# Patient Record
Sex: FEMALE | Race: BLACK OR AFRICAN AMERICAN | NOT HISPANIC OR LATINO | Employment: UNEMPLOYED | URBAN - METROPOLITAN AREA
[De-identification: names, ages, dates, MRNs, and addresses within clinical notes are randomized per-mention and may not be internally consistent; named-entity substitution may affect disease eponyms.]

---

## 2022-04-08 ENCOUNTER — HOSPITAL ENCOUNTER (EMERGENCY)
Facility: HOSPITAL | Age: 22
Discharge: HOME | End: 2022-04-09
Attending: EMERGENCY MEDICINE | Admitting: EMERGENCY MEDICINE
Payer: COMMERCIAL

## 2022-04-08 ENCOUNTER — APPOINTMENT (EMERGENCY)
Dept: RADIOLOGY | Facility: HOSPITAL | Age: 22
End: 2022-04-08
Payer: COMMERCIAL

## 2022-04-08 DIAGNOSIS — R10.9 ACUTE ABDOMINAL PAIN: Primary | ICD-10-CM

## 2022-04-08 DIAGNOSIS — R03.0 ELEVATED BLOOD PRESSURE READING: ICD-10-CM

## 2022-04-08 DIAGNOSIS — R11.0 NAUSEA: ICD-10-CM

## 2022-04-08 DIAGNOSIS — N93.9 VAGINAL SPOTTING: ICD-10-CM

## 2022-04-08 LAB
ALBUMIN SERPL BCP-MCNC: 4.2 G/DL (ref 3.5–5)
ALP SERPL-CCNC: 113 U/L (ref 46–116)
ALT SERPL W P-5'-P-CCNC: 23 U/L (ref 12–78)
ANION GAP SERPL CALCULATED.3IONS-SCNC: 4 MMOL/L (ref 4–13)
AST SERPL W P-5'-P-CCNC: 18 U/L (ref 5–45)
BASOPHILS # BLD AUTO: 0.04 THOUSANDS/ΜL (ref 0–0.1)
BASOPHILS NFR BLD AUTO: 1 % (ref 0–1)
BILIRUB SERPL-MCNC: 0.31 MG/DL (ref 0.2–1)
BILIRUB UR QL STRIP: NEGATIVE
BUN SERPL-MCNC: 11 MG/DL (ref 5–25)
CALCIUM SERPL-MCNC: 9.3 MG/DL (ref 8.3–10.1)
CHLORIDE SERPL-SCNC: 106 MMOL/L (ref 100–108)
CLARITY UR: CLEAR
CO2 SERPL-SCNC: 27 MMOL/L (ref 21–32)
COLOR UR: NORMAL
CREAT SERPL-MCNC: 0.72 MG/DL (ref 0.6–1.3)
EOSINOPHIL # BLD AUTO: 0.11 THOUSAND/ΜL (ref 0–0.61)
EOSINOPHIL NFR BLD AUTO: 2 % (ref 0–6)
ERYTHROCYTE [DISTWIDTH] IN BLOOD BY AUTOMATED COUNT: 13.6 % (ref 11.6–15.1)
EXT PREG TEST URINE: NEGATIVE
EXT. CONTROL ED NAV: NORMAL
GFR SERPL CREATININE-BSD FRML MDRD: 120 ML/MIN/1.73SQ M
GLUCOSE SERPL-MCNC: 90 MG/DL (ref 65–140)
GLUCOSE UR STRIP-MCNC: NEGATIVE MG/DL
HCT VFR BLD AUTO: 38.1 % (ref 34.8–46.1)
HGB BLD-MCNC: 12 G/DL (ref 11.5–15.4)
HGB UR QL STRIP.AUTO: NEGATIVE
IMM GRANULOCYTES # BLD AUTO: 0.01 THOUSAND/UL (ref 0–0.2)
IMM GRANULOCYTES NFR BLD AUTO: 0 % (ref 0–2)
KETONES UR STRIP-MCNC: NEGATIVE MG/DL
LEUKOCYTE ESTERASE UR QL STRIP: NEGATIVE
LYMPHOCYTES # BLD AUTO: 2 THOUSANDS/ΜL (ref 0.6–4.47)
LYMPHOCYTES NFR BLD AUTO: 37 % (ref 14–44)
MCH RBC QN AUTO: 23 PG (ref 26.8–34.3)
MCHC RBC AUTO-ENTMCNC: 31.5 G/DL (ref 31.4–37.4)
MCV RBC AUTO: 73 FL (ref 82–98)
MONOCYTES # BLD AUTO: 0.52 THOUSAND/ΜL (ref 0.17–1.22)
MONOCYTES NFR BLD AUTO: 10 % (ref 4–12)
NEUTROPHILS # BLD AUTO: 2.7 THOUSANDS/ΜL (ref 1.85–7.62)
NEUTS SEG NFR BLD AUTO: 50 % (ref 43–75)
NITRITE UR QL STRIP: NEGATIVE
NRBC BLD AUTO-RTO: 0 /100 WBCS
PH UR STRIP.AUTO: 7.5 [PH]
PLATELET # BLD AUTO: 362 THOUSANDS/UL (ref 149–390)
PMV BLD AUTO: 10.3 FL (ref 8.9–12.7)
POTASSIUM SERPL-SCNC: 3.9 MMOL/L (ref 3.5–5.3)
PROT SERPL-MCNC: 8.3 G/DL (ref 6.4–8.2)
PROT UR STRIP-MCNC: NEGATIVE MG/DL
RBC # BLD AUTO: 5.22 MILLION/UL (ref 3.81–5.12)
SODIUM SERPL-SCNC: 137 MMOL/L (ref 136–145)
SP GR UR STRIP.AUTO: 1.03 (ref 1–1.03)
UROBILINOGEN UR STRIP-ACNC: <2 MG/DL
WBC # BLD AUTO: 5.38 THOUSAND/UL (ref 4.31–10.16)

## 2022-04-08 PROCEDURE — 81025 URINE PREGNANCY TEST: CPT | Performed by: EMERGENCY MEDICINE

## 2022-04-08 PROCEDURE — 81003 URINALYSIS AUTO W/O SCOPE: CPT | Performed by: EMERGENCY MEDICINE

## 2022-04-08 PROCEDURE — 80053 COMPREHEN METABOLIC PANEL: CPT | Performed by: EMERGENCY MEDICINE

## 2022-04-08 PROCEDURE — 76830 TRANSVAGINAL US NON-OB: CPT

## 2022-04-08 PROCEDURE — 76856 US EXAM PELVIC COMPLETE: CPT

## 2022-04-08 PROCEDURE — 99284 EMERGENCY DEPT VISIT MOD MDM: CPT

## 2022-04-08 PROCEDURE — 99284 EMERGENCY DEPT VISIT MOD MDM: CPT | Performed by: EMERGENCY MEDICINE

## 2022-04-08 PROCEDURE — 85025 COMPLETE CBC W/AUTO DIFF WBC: CPT | Performed by: EMERGENCY MEDICINE

## 2022-04-08 PROCEDURE — 96374 THER/PROPH/DIAG INJ IV PUSH: CPT

## 2022-04-08 PROCEDURE — 36415 COLL VENOUS BLD VENIPUNCTURE: CPT | Performed by: EMERGENCY MEDICINE

## 2022-04-08 PROCEDURE — 96375 TX/PRO/DX INJ NEW DRUG ADDON: CPT

## 2022-04-08 RX ORDER — ONDANSETRON 2 MG/ML
4 INJECTION INTRAMUSCULAR; INTRAVENOUS ONCE
Status: COMPLETED | OUTPATIENT
Start: 2022-04-08 | End: 2022-04-08

## 2022-04-08 RX ORDER — KETOROLAC TROMETHAMINE 30 MG/ML
15 INJECTION, SOLUTION INTRAMUSCULAR; INTRAVENOUS ONCE
Status: COMPLETED | OUTPATIENT
Start: 2022-04-08 | End: 2022-04-08

## 2022-04-08 RX ORDER — HALOPERIDOL 5 MG/ML
5 INJECTION INTRAMUSCULAR ONCE
Status: COMPLETED | OUTPATIENT
Start: 2022-04-08 | End: 2022-04-09

## 2022-04-08 RX ORDER — ACETAMINOPHEN 325 MG/1
650 TABLET ORAL ONCE
Status: COMPLETED | OUTPATIENT
Start: 2022-04-08 | End: 2022-04-08

## 2022-04-08 RX ADMIN — ONDANSETRON 4 MG: 2 INJECTION INTRAMUSCULAR; INTRAVENOUS at 20:39

## 2022-04-08 RX ADMIN — KETOROLAC TROMETHAMINE 15 MG: 30 INJECTION, SOLUTION INTRAMUSCULAR at 20:39

## 2022-04-08 RX ADMIN — ACETAMINOPHEN 650 MG: 325 TABLET ORAL at 20:39

## 2022-04-08 NOTE — Clinical Note
Bea Wan was seen and treated in our emergency department on 4/8/2022  Diagnosis:     Kyle Perez  may return to school on return date  She may return on this date: 04/11/2022         If you have any questions or concerns, please don't hesitate to call        Nandini Epstein MD    ______________________________           _______________          _______________  Hospital Representative                              Date                                Time

## 2022-04-08 NOTE — Clinical Note
Bonilevar Darnell was seen and treated in our emergency department on 4/8/2022  Diagnosis:     Camelia Borges  may return to school on return date  She may return on this date: 04/11/2022         If you have any questions or concerns, please don't hesitate to call        Magda Childers MD    ______________________________           _______________          _______________  Hospital Representative                              Date                                Time

## 2022-04-08 NOTE — ED PROVIDER NOTES
History  Chief Complaint   Patient presents with    Abdominal Pain     pt c/o lower abd pain, states "i usually get ovarian cysts and it feels like that but i haven't even gotten my period, i was supposed to get it two weeks ago but i've just been lightly spotting" denies any chance of pregnancy     Patient is a 50-year-old female, with a history significant for menorrhagia and right ovarian cyst diagnosed an estimated three months ago, who presents to the ED today due to bilateral, atraumatic, sudden onset, progressively worsening, severe in intensity, stabbing in character, bilateral lower abdominal pain felt in the lower quadrants equally  There is associated nausea without vomiting as well as vaginal spotting  Patient also states that her menstrual cycle is two weeks late  She states that she has previously been regular with her menstrual cycle  Patient denies history of prior pregnancy, possibility being pregnant at this time, history of abdominal surgery  She continues to make bowel movements  Patient's symptoms began two days ago  Patient has been taking ibuprofen to remit her symptoms  Lying flat exacerbates her symptoms  Patient denies fever, chest pain, shortness of breath, dysuria, polyuria  Patient is without other complaints at this time     - No language barrier    - History obtained from patient  - There are no limitations to the history obtained  - Previous charting was reviewed          None       History reviewed  No pertinent past medical history  History reviewed  No pertinent surgical history  History reviewed  No pertinent family history  I have reviewed and agree with the history as documented      E-Cigarette/Vaping     E-Cigarette/Vaping Substances     Social History     Tobacco Use    Smoking status: Never Smoker    Smokeless tobacco: Never Used   Substance Use Topics    Alcohol use: Never    Drug use: Never        Review of Systems   Constitutional: Negative for fever    HENT: Negative for trouble swallowing  Eyes: Negative for visual disturbance  Respiratory: Negative for shortness of breath  Cardiovascular: Negative for chest pain  Gastrointestinal: Positive for abdominal pain and nausea  Negative for blood in stool, constipation and vomiting  Endocrine: Negative for polyuria  Genitourinary: Positive for vaginal bleeding  Negative for dysuria and vaginal discharge  Musculoskeletal: Negative for gait problem  Skin: Negative for rash  Allergic/Immunologic: Positive for environmental allergies  Neurological: Negative for weakness and numbness  Hematological: Negative for adenopathy  Psychiatric/Behavioral: Negative for confusion  All other systems reviewed and are negative  Physical Exam  ED Triage Vitals [04/08/22 1752]   Temperature Pulse Respirations Blood Pressure SpO2   98 6 °F (37 °C) 82 16 (!) 132/101 100 %      Temp Source Heart Rate Source Patient Position - Orthostatic VS BP Location FiO2 (%)   Tympanic Monitor Sitting Left arm --      Pain Score       8             Orthostatic Vital Signs  Vitals:    04/08/22 1752   BP: (!) 132/101   Pulse: 82   Patient Position - Orthostatic VS: Sitting       Physical Exam  Vitals and nursing note reviewed  Constitutional:       General: She is not in acute distress  Appearance: She is not ill-appearing, toxic-appearing or diaphoretic  Comments: Patient appears comfortable during my evaluation    HENT:      Head: Normocephalic and atraumatic  Right Ear: External ear normal       Left Ear: External ear normal       Nose: Nose normal  No rhinorrhea  Mouth/Throat:      Mouth: Mucous membranes are moist       Pharynx: Oropharynx is clear  No oropharyngeal exudate or posterior oropharyngeal erythema  Eyes:      General: No scleral icterus  Right eye: No discharge  Left eye: No discharge        Conjunctiva/sclera: Conjunctivae normal       Pupils: Pupils are equal, round, and reactive to light  Neck:      Comments: Patient is spontaneously rotating their neck to the left and right during the history and physical exam interaction without difficulty or apparent discomfort    Cardiovascular:      Rate and Rhythm: Normal rate and regular rhythm  Pulses: Normal pulses  Heart sounds: Normal heart sounds  No murmur heard  No friction rub  No gallop  Comments: 2+ Radial  Pulmonary:      Effort: Pulmonary effort is normal  No respiratory distress  Breath sounds: Normal breath sounds  No stridor  No wheezing, rhonchi or rales  Abdominal:      General: Abdomen is flat  There is no distension  Palpations: Abdomen is soft  Tenderness: There is abdominal tenderness in the right lower quadrant, suprapubic area and left lower quadrant  There is right CVA tenderness and left CVA tenderness  There is no guarding or rebound  Negative signs include McBurney's sign  Musculoskeletal:         General: No deformity  Cervical back: Neck supple  No tenderness  No muscular tenderness  Lymphadenopathy:      Cervical: No cervical adenopathy  Skin:     General: Skin is warm and dry  Capillary Refill: Capillary refill takes less than 2 seconds  Neurological:      Mental Status: She is alert  Comments: Patient is speaking clearly in complete sentences  Patient is answering appropriately and able follow commands  Patient is moving all four extremities spontaneously  No facial droop  Tongue midline        Psychiatric:         Mood and Affect: Mood normal          Behavior: Behavior normal          ED Medications  Medications   haloperidol lactate (HALDOL) injection 5 mg (has no administration in time range)   acetaminophen (TYLENOL) tablet 650 mg (650 mg Oral Given 4/8/22 2039)   ondansetron (ZOFRAN) injection 4 mg (4 mg Intravenous Given 4/8/22 2039)   ketorolac (TORADOL) injection 15 mg (15 mg Intravenous Given 4/8/22 2039)       Diagnostic Studies  Results Reviewed     Procedure Component Value Units Date/Time    Comprehensive metabolic panel [[id number]]  (Abnormal) Collected: 04/08/22 2034    Lab Status: Final result Specimen: Blood from Arm, Right Updated: 04/08/22 2106     Sodium 137 mmol/L      Potassium 3 9 mmol/L      Chloride 106 mmol/L      CO2 27 mmol/L      ANION GAP 4 mmol/L      BUN 11 mg/dL      Creatinine 0 72 mg/dL      Glucose 90 mg/dL      Calcium 9 3 mg/dL      AST 18 U/L      ALT 23 U/L      Alkaline Phosphatase 113 U/L      Total Protein 8 3 g/dL      Albumin 4 2 g/dL      Total Bilirubin 0 31 mg/dL      eGFR 120 ml/min/1 73sq m     Narrative:      National Kid[employer] guidelines for Chronic Kidney Disease (CKD):     Stage 1 with normal or high GFR (GFR > 90 mL/min/1 73 square meters)    Stage 2 Mild CKD (GFR = 60-89 mL/min/1 73 square meters)    Stage 3A Moderate CKD (GFR = 45-59 mL/min/1 73 square meters)    Stage 3B Moderate CKD (GFR = 30-44 mL/min/1 73 square meters)    Stage 4 Severe CKD (GFR = 15-29 mL/min/1 73 square meters)    Stage 5 End Stage CKD (GFR <15 mL/min/1 73 square meters)  Note: GFR calculation is accurate only with a steady state creatinine    CBC and differential [[id number]]  (Abnormal) Collected: 04/08/22 2034    Lab Status: Final result Specimen: Blood from Arm, Right Updated: 04/08/22 2052     WBC 5 38 Thousand/uL      RBC 5 22 Million/uL      Hemoglobin 12 0 g/dL      Hematocrit 38 1 %      MCV 73 fL      MCH 23 0 pg      MCHC 31 5 g/dL      RDW 13 6 %      MPV 10 3 fL      Platelets 517 Thousands/uL      nRBC 0 /100 WBCs      Neutrophils Relative 50 %      Immat GRANS % 0 %      Lymphocytes Relative 37 %      Monocytes Relative 10 %      Eosinophils Relative 2 %      Basophils Relative 1 %      Neutrophils Absolute 2 70 Thousands/µL      Immature Grans Absolute 0 01 Thousand/uL      Lymphocytes Absolute 2 00 Thousands/µL      Monocytes Absolute 0 52 Thousand/µL      Eosinophils Absolute 0 11 Thousand/µL      Basophils Absolute 0 04 Thousands/µL     UA w Reflex to Microscopic w Reflex to Culture [[id number]] Collected: 04/08/22 2034    Lab Status: Final result Specimen: Urine, Clean Catch Updated: 04/08/22 2049     Color, UA Light Yellow     Clarity, UA Clear     Specific Gravity, UA 1 026     pH, UA 7 5     Leukocytes, UA Negative     Nitrite, UA Negative     Protein, UA Negative mg/dl      Glucose, UA Negative mg/dl      Ketones, UA Negative mg/dl      Urobilinogen, UA <2 0 mg/dl      Bilirubin, UA Negative     Blood, UA Negative    POCT pregnancy, urine [[id number]]  (Normal) Resulted: 04/08/22 2028    Lab Status: Final result Updated: 04/08/22 2028     EXT PREG TEST UR (Ref: Negative) Negative     Control Valid                 US pelvis complete w transvaginal   Final Result by Carl Huertas MD (04/08 2233)       No acute sonographic findings  Workstation performed: [id number]               Procedures  Procedures      ED Course  ED Course as of 04/08/22 2333   Fri Apr 08, 2022   2304 Leukocytes, UA: Negative   2304 Nitrite, UA: Negative   2304 As is going over results with patient, patient became tearful stating that she was frustrated that she still has this pain despite seeing family Medicine, Ob, in the ED  Patient has no signs of peritonitis:  No guarding, rebound, pain with jumping up and down on repeat examination  Patient tolerated p o  Patient did not drive herself in she has a ride back    Patient would like to try another medication to help with her pain and Haldol was offered and agreed to   2312 Blood, UA: Negative   2312 Leukocytes, UA: Negative   2312 Nitrite, UA: Negative                                       [facility]  Number of Diagnoses or Management Options  Acute abdominal pain  Elevated blood pressure reading  Nausea  Vaginal spotting  Diagnosis management comments: Patient is a 25-year-old female, with a history significant for menorrhagia and right ovarian cyst diagnosed an estimated three months ago, who presents to the ED today due to bilateral, atraumatic, sudden onset, progressively worsening, severe in intensity, stabbing in character, bilateral lower abdominal pain felt in the lower quadrants equally  There is associated nausea without vomiting as well as vaginal spotting  Patient also states that her menstrual cycle is two weeks late  Patient has been taking ibuprofen to remit her symptoms  Lying flat exacerbates her symptoms  Patient denies fever, chest pain, shortness of breath, dysuria, polyuria  Patient is currently afebrile and hemodynamically stable  Her physical exam is notable for bilateral CVA tenderness as well as bilateral lower abdominal tenderness with neither rebound or guarding  This presentation is concerning for:  Pregnancy, ectopic pregnancy, ruptured ovarian cyst, UTI, anovulatory cycle, nephrolithiasis  Will investigate with CBC, CMP, UA, urine pregnancy  Will manage with Toradol, Tylenol, further based workup        Disposition  Final diagnoses:   Acute abdominal pain   Elevated blood pressure reading   Vaginal spotting   Nausea     Time reflects when diagnosis was documented in both MDM as applicable and the Disposition within this note     Time User Action Codes Description Comment    4/8/2022 11:06 PM Jonny COLIN Add [R10 9] Acute abdominal pain     4/8/2022 11:08 PM Curtis Eller [R03 0] Elevated blood pressure reading     4/8/2022 11:08 PM Jonny COLIN Add [N93 9] Vaginal spotting     4/8/2022 11:09 PM Curtis Eller [R11 0] Nausea       ED Disposition     None      Follow-up Information     Follow up With Specialties Details Why Contact Info Additional Information    Malick Wright  Schedule an appointment as soon as possible for a visit   [place] [id number] [facility] Schedule an appointment as soon as possible for a visit   [address] [place] [id number] [address], [address] [place] [address], [address] [place] [address], [id number] [phone]          Patient's Medications    No medications on file         PDMP Review     None           ED Provider  Attending physically available and evaluated Isaiah Neighbor  I managed the patient along with the ED Attending      Electronically Signed by         Petra Trevizo MD  04/08/22 3918

## 2022-04-09 VITALS
RESPIRATION RATE: 16 BRPM | SYSTOLIC BLOOD PRESSURE: 127 MMHG | OXYGEN SATURATION: 100 % | WEIGHT: 175 LBS | TEMPERATURE: 98.6 F | HEART RATE: 82 BPM | DIASTOLIC BLOOD PRESSURE: 61 MMHG

## 2022-04-09 LAB
ATRIAL RATE: 77 BPM
P AXIS: 56 DEGREES
PR INTERVAL: 204 MS
QRS AXIS: 68 DEGREES
QRSD INTERVAL: 66 MS
QT INTERVAL: 348 MS
QTC INTERVAL: 393 MS
T WAVE AXIS: 42 DEGREES
VENTRICULAR RATE: 77 BPM

## 2022-04-09 PROCEDURE — 93010 ELECTROCARDIOGRAM REPORT: CPT | Performed by: INTERNAL MEDICINE

## 2022-04-09 PROCEDURE — 96372 THER/PROPH/DIAG INJ SC/IM: CPT

## 2022-04-09 PROCEDURE — 93005 ELECTROCARDIOGRAM TRACING: CPT

## 2022-04-09 RX ADMIN — HALOPERIDOL LACTATE 5 MG: 5 INJECTION, SOLUTION INTRAMUSCULAR at 01:13

## 2022-04-09 NOTE — DISCHARGE INSTRUCTIONS
You were evaluated in the emergency department for: abdominal pain  You can access your current and pending results through [facility]  A radiologist will take a second look at your X-Rays, if you had any, and you will be contacted with any new findings  You should follow-up with your primary care provider, as soon as possible, for re-evaluation  If you do not have a primary care provider, I have referred you to [address] [place] [address]  You will be contacted about scheduling an appointment  Their phone number is also included on this paperwork  You may take 650mg of tylenol every four to six hours, not exceeding 3,000mg daily, for the management of your discomfort  You may also take ibuprofen, 400mg every six to eight hours  Your workup revealed no emergent features at this time; however, many disease processes are dynamic:    Please, return to the emergency department if you experience new or worsening symptoms, fever, chest pain, shortness of breath, difficulty breathing, dizziness, abdominal pain, persistent nausea/vomiting, syncope or passing out, blood in your urine or stool, coughing up blood, leg swelling/pain, urinary retention, bowel or bladder incontinence, numbness between your legs  Additionally, your blood pressure was measured to be high  This is something that you should discuss with your primary care provider and have re-checked within one week

## 2022-12-09 ENCOUNTER — APPOINTMENT (OUTPATIENT)
Dept: LAB | Facility: CLINIC | Age: 22
End: 2022-12-09

## 2022-12-09 DIAGNOSIS — Z02.1 PRE-EMPLOYMENT HEALTH SCREENING EXAMINATION: ICD-10-CM

## 2022-12-10 LAB
MEV IGG SER QL IA: NORMAL
MUV IGG SER QL IA: NORMAL
RUBV IGG SERPL IA-ACNC: >175 IU/ML
VZV IGG SER QL IA: NORMAL

## 2022-12-11 LAB
GAMMA INTERFERON BACKGROUND BLD IA-ACNC: 0.03 IU/ML
M TB IFN-G BLD-IMP: NEGATIVE
M TB IFN-G CD4+ BCKGRND COR BLD-ACNC: 0 IU/ML
M TB IFN-G CD4+ BCKGRND COR BLD-ACNC: 0.01 IU/ML
MITOGEN IGNF BCKGRD COR BLD-ACNC: 1.92 IU/ML